# Patient Record
Sex: MALE | Race: WHITE | NOT HISPANIC OR LATINO | URBAN - NONMETROPOLITAN AREA
[De-identification: names, ages, dates, MRNs, and addresses within clinical notes are randomized per-mention and may not be internally consistent; named-entity substitution may affect disease eponyms.]

---

## 2019-01-11 ENCOUNTER — IMPORTED ENCOUNTER (OUTPATIENT)
Dept: URBAN - NONMETROPOLITAN AREA CLINIC 1 | Facility: CLINIC | Age: 79
End: 2019-01-11

## 2019-01-11 PROBLEM — H26.493: Noted: 2019-01-11

## 2019-01-11 PROBLEM — H16.223: Noted: 2019-01-11

## 2019-01-11 PROBLEM — Z96.1: Noted: 2019-01-11

## 2019-01-11 PROBLEM — H35.3132: Noted: 2019-01-11

## 2019-01-11 PROCEDURE — 99214 OFFICE O/P EST MOD 30 MIN: CPT

## 2019-01-11 PROCEDURE — 92134 CPTRZ OPH DX IMG PST SGM RTA: CPT

## 2019-01-11 NOTE — PATIENT DISCUSSION
ARMD OU-  discussed findings w/patient-  OCT mac done today OD: R. 1+ hard drusen RPE changes OS: R. 1+ hard drusen RPE changes-  recommend patient start PreserVision AREDS 2 PO BID samples given-  continue without Amsler Grid at this time-  monitor Q6 months or prn Pseudophakia w/PCO OU-  discussed findings w/patient-  both intraocular lens are stable and in position-  1+ central PCO OU not yet surgical-  continue to monitor Q6months or prn Dry Eye Syndrome-  discussed findings w/patient-  continue tears OU PRN-  monitor prn; Dr's Notes: MR DFE 1/11/2019OCT mac 1/11/2019

## 2019-03-08 ENCOUNTER — IMPORTED ENCOUNTER (OUTPATIENT)
Dept: URBAN - NONMETROPOLITAN AREA CLINIC 1 | Facility: CLINIC | Age: 79
End: 2019-03-08

## 2019-03-08 PROBLEM — H26.493: Noted: 2019-03-08

## 2019-03-08 PROBLEM — H16.223: Noted: 2019-03-08

## 2019-03-08 PROBLEM — H35.3132: Noted: 2019-03-08

## 2019-03-08 PROBLEM — H00.021: Noted: 2019-03-08

## 2019-03-08 PROBLEM — Z96.1: Noted: 2019-03-08

## 2019-03-08 PROCEDURE — 92012 INTRM OPH EXAM EST PATIENT: CPT

## 2019-03-08 NOTE — PATIENT DISCUSSION
Internal Hordeolum RUL-  discussed findings w/patient-  start Keflex 500mg PO BID x 10 days-  start Maxitrol QID OD -  RTC 2 week f/u or prn ARMD OU-  discussed findings w/patient-  OCT mac done today OD: R. 1+ hard drusen RPE changes OS: R. 1+ hard drusen RPE changes-  recommend patient start PreserVision AREDS 2 PO BID samples given-  continue without Amsler Grid at this time-  monitor Q6 months or prn Pseudophakia w/PCO OU-  discussed findings w/patient-  both intraocular lens are stable and in position-  1+ central PCO OU not yet surgical-  continue to monitor Q6months or prn Dry Eye Syndrome-  discussed findings w/patient-  continue tears OU PRN-  monitor prn; 's Notes: MR DFE 1/11/2019OCT mac 1/11/2019

## 2019-03-22 ENCOUNTER — IMPORTED ENCOUNTER (OUTPATIENT)
Dept: URBAN - NONMETROPOLITAN AREA CLINIC 1 | Facility: CLINIC | Age: 79
End: 2019-03-22

## 2019-03-22 PROCEDURE — 92012 INTRM OPH EXAM EST PATIENT: CPT

## 2019-03-22 NOTE — PATIENT DISCUSSION
Internal Hordeolum RUL resolving/ Bleph/HILTON-  discussed findings w/patient-  patient much improved today-  patient finished Keflex 500mg PO BID -  start Sterilid QD OU-  start Systane Complete BID OU samples given -  d/c Maxitrol QID OD -  RTC as scheduled or prn ARMD OU-  discussed findings w/patient-  OCT mac done 1/11/2019 OD: R. 1+ hard drusen RPE changes OS: R. 1+ hard drusen RPE changes-  recommend patient start PreserVision AREDS 2 PO BID samples given-  continue without Amsler Grid at this time-  monitor Q6 months or prn Pseudophakia w/PCO OU-  discussed findings w/patient-  both intraocular lens are stable and in position-  1+ central PCO OU not yet surgical-  continue to monitor Q6months or prn; 's Notes: MR DFE 1/11/2019OCT mac 1/11/2019

## 2019-07-19 ENCOUNTER — IMPORTED ENCOUNTER (OUTPATIENT)
Dept: URBAN - NONMETROPOLITAN AREA CLINIC 1 | Facility: CLINIC | Age: 79
End: 2019-07-19

## 2019-07-19 PROCEDURE — 99214 OFFICE O/P EST MOD 30 MIN: CPT

## 2019-07-19 PROCEDURE — 92134 CPTRZ OPH DX IMG PST SGM RTA: CPT

## 2019-07-19 NOTE — PATIENT DISCUSSION
ARMD OU-  discussed findings w/patient-  OCT mac done today OD: R. 1+ hard drusen RPE changes OS: R. 1+ hard drusen RPE changes stable from previous-  continue PreserVision AREDS 2 PO BID-  continue without Amsler Grid at this time-  monitor Q6 months w/Fundus Photos or prn Pseudophakia w/PCO OS>OD-  discussed findings w/patient-  both intraocular lens are stable and in position-  1+ PCO OD and 2+ PCO OS -  discussed referral to [facility] for PCO eval soon will re-evaluate at next visit-  continue to monitor Q6months w/BAT or prn; 's Notes: MR DFE 1/11/2019OCT mac 7/19/2019Fundus

## 2020-01-24 ENCOUNTER — IMPORTED ENCOUNTER (OUTPATIENT)
Dept: URBAN - NONMETROPOLITAN AREA CLINIC 1 | Facility: CLINIC | Age: 80
End: 2020-01-24

## 2020-01-24 PROBLEM — H26.493: Noted: 2020-01-24

## 2020-01-24 PROBLEM — Z96.1: Noted: 2020-01-24

## 2020-01-24 PROBLEM — H35.3132: Noted: 2020-01-24

## 2020-01-24 PROBLEM — H16.223: Noted: 2020-01-24

## 2020-01-24 PROBLEM — H00.021: Noted: 2020-01-24

## 2020-01-24 PROCEDURE — 92250 FUNDUS PHOTOGRAPHY W/I&R: CPT

## 2020-01-24 PROCEDURE — 92014 COMPRE OPH EXAM EST PT 1/>: CPT

## 2020-01-24 NOTE — PATIENT DISCUSSION
ARMD OU-  discussed findings w/patient- Fundus Photos done today. -  OCT mac done today OD: R. 1+ hard drusen RPE changes OS: R. 1+ hard drusen RPE changes stable from previous-  Fundus Photos done today: OD&OS stabl withclinical findings -  continue PreserVision AREDS 2 PO BID-  continue without Amsler Grid at this time-  monitor Q6 months w/Fundus Photos or prn Pseudophakia w/PCO OS>OD-  discussed findings w/patient-  both intraocular lens are stable and in position-  2+ PCO noted OU no treatment indicated at this time-  discussed referral to [address] [place] [address] for PCO eval soon will re-evaluate at next visit-  continue to monitor Q6months w/BAT or prn HILTON OU -  discussed findings w/patient-  discussed with patient his decreased VA and FB sensation is likely secondary to a HILTON flare up. Will have patient start [address] 0.1% and recheck in 2 weeks.  -  continue Systane BID OU -  start FML 0.1% QID OU x 2 weeks-  continue Sterilid QD OU -  monitor 2 weeks f/u HILTON or prn; 's Notes: MR DFE 1/23/2020OCT mac 7/19/2019Fundus

## 2020-02-06 ENCOUNTER — IMPORTED ENCOUNTER (OUTPATIENT)
Dept: URBAN - NONMETROPOLITAN AREA CLINIC 1 | Facility: CLINIC | Age: 80
End: 2020-02-06

## 2020-02-06 PROBLEM — H35.3132: Noted: 2020-02-06

## 2020-02-06 PROBLEM — Z96.1: Noted: 2020-02-06

## 2020-02-06 PROBLEM — H26.493: Noted: 2020-02-06

## 2020-02-06 PROBLEM — H16.223: Noted: 2020-02-06

## 2020-02-06 PROCEDURE — 99213 OFFICE O/P EST LOW 20 MIN: CPT

## 2020-02-06 NOTE — PATIENT DISCUSSION
HILTON OU -  discussed findings w/patient-  condition has improved since last appt-  continue Systane BID OU -  taper FML 0.1% BID OU x 2 weeks then d/c-  continue Sterilid QD OU -  monitor as scheduled or prn Pseudophakia w/PCO OS>OD-  discussed findings w/patient-  both intraocular lens are stable and in position-  2+ PCO noted OU visually significant with patient complaints -  discussed referral to [address] [place] [address] for PCO eval patient agrees with plan -  refer to [address] [place] [address] for PCO EvalARMD OU-  discussed findings w/patient -  OCT mac done 7/19/2019 OD: R. 1+ hard drusen RPE changes OS: R. 1+ hard drusen RPE changes stable from previous-  Fundus Photos done 1/24/2020: OD&OS stabl withclinical findings -  continue PreserVision AREDS 2 PO BID-  continue without Amsler Grid at this time-  monitor Q6 months w/OCT Mac or prn; Dr's Notes:  JOSHUA 1/23/2020OCT mac 7/19/2019Fundus 1/24/2020

## 2020-06-22 ENCOUNTER — IMPORTED ENCOUNTER (OUTPATIENT)
Dept: URBAN - NONMETROPOLITAN AREA CLINIC 1 | Facility: CLINIC | Age: 80
End: 2020-06-22

## 2020-06-22 PROBLEM — Z96.1: Noted: 2020-02-06

## 2020-06-22 PROBLEM — H16.223: Noted: 2020-06-22

## 2020-06-22 PROBLEM — H35.3132: Noted: 2020-02-06

## 2020-06-22 PROBLEM — H26.493: Noted: 2020-06-22

## 2020-06-22 PROCEDURE — 92014 COMPRE OPH EXAM EST PT 1/>: CPT

## 2020-06-22 PROCEDURE — 66821 AFTER CATARACT LASER SURGERY: CPT

## 2020-06-22 NOTE — PATIENT DISCUSSION
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 -  Pt elects to proceed. YAG Caps OD today and YAG Caps OS on Thursday @ [facility].  HILTON OU -  discussed findings w/patient-  condition has improved since last appt-  continue Systane BID OU -  continue Sterilid QD OU -  monitor as scheduled or prn ARMD OU-  discussed findings w/patient -  OCT mac done 7/19/2019 OD: R. 1+ hard drusen RPE changes OS: R. 1+ hard drusen RPE changes stable from previous-  Fundus Photos done 1/24/2020: OD&OS stabl withclinical findings -  continue PreserVision AREDS 2 PO BID-  continue without Amsler Grid at this time-  monitor Q6 months w/OCT Mac or prn; Dr's Notes: MR LEWIS 1/23/2020OCT mac 7/19/2019Fundus 1/24/2020

## 2020-07-02 ENCOUNTER — IMPORTED ENCOUNTER (OUTPATIENT)
Dept: URBAN - NONMETROPOLITAN AREA CLINIC 1 | Facility: CLINIC | Age: 80
End: 2020-07-02

## 2020-07-02 PROCEDURE — 99024 POSTOP FOLLOW-UP VISIT: CPT

## 2020-07-02 NOTE — PATIENT DISCUSSION
s/p YAG PC OS 6/25/2020 OD 6/22/2020-  discussed findings w/patient-  doing well at this time-  patient defers spectacle Rx -  continue to monitor -  3 mo f/u or prn; 's Notes: MR DFE 1/23/2020OCT mac 7/19/2019Fundus 1/24/2020

## 2020-11-05 ENCOUNTER — IMPORTED ENCOUNTER (OUTPATIENT)
Dept: URBAN - NONMETROPOLITAN AREA CLINIC 1 | Facility: CLINIC | Age: 80
End: 2020-11-05

## 2020-11-05 PROCEDURE — 92134 CPTRZ OPH DX IMG PST SGM RTA: CPT

## 2020-11-05 PROCEDURE — 99213 OFFICE O/P EST LOW 20 MIN: CPT

## 2020-11-05 NOTE — PATIENT DISCUSSION
ARMD OU-  discussed findings w/patient -  OCT mac done 11/5/2020    OD: mild RPE changes mild hard drusen    OS: mild RPE changes mild hard drusen-  Fundus Photos done 1/24/2020: OD&OS marcia withclinical findings -  continue PreserVision AREDS 2 PO BID-  continue without Amsler Grid at this time-  monitor Q6 months w/OCT Mac or prn Pseudophakia w/Open Capsules OU-  discussed findings w/patient-  both intraocular lens are stable and in position-  open capsules OU-  continue to monitor as scheduled or prnDES OU -  discussed findings w/patient-  condition has improved since last appt-  continue Systane BID OU -  continue Sterilid QD OU -  monitor as scheduled or prn; 's Notes:  DFE 1/23/2020OCT mac 11/5/2020Fundus 1/24/2020

## 2021-02-07 PROBLEM — H35.3132: Noted: 2021-02-07

## 2021-02-07 PROBLEM — H16.223: Noted: 2021-02-07

## 2021-02-07 PROBLEM — Z96.1: Noted: 2021-02-07

## 2021-05-18 ENCOUNTER — IMPORTED ENCOUNTER (OUTPATIENT)
Dept: URBAN - NONMETROPOLITAN AREA CLINIC 1 | Facility: CLINIC | Age: 81
End: 2021-05-18

## 2021-05-18 PROBLEM — H52.03: Noted: 2021-05-18

## 2021-05-18 PROBLEM — Z96.1: Noted: 2021-05-18

## 2021-05-18 PROBLEM — H52.4: Noted: 2021-05-18

## 2021-05-18 PROBLEM — H16.223: Noted: 2021-05-18

## 2021-05-18 PROBLEM — H35.3132: Noted: 2021-05-18

## 2021-05-18 PROBLEM — H52.223: Noted: 2021-05-18

## 2021-05-18 PROCEDURE — 92015 DETERMINE REFRACTIVE STATE: CPT

## 2021-05-18 PROCEDURE — 92014 COMPRE OPH EXAM EST PT 1/>: CPT

## 2021-05-18 NOTE — PATIENT DISCUSSION
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-  discussed findings w/patient-  no spectacle Rx issued at this time patient defers-  continue w/NVO's for now-  RTC as scheduled or prnARMD OU-  discussed findings w/patient -  OCT mac done 11/5/2020    OD: mild RPE changes mild hard drusen    OS: mild RPE changes mild hard drusen-  Fundus Photos done 1/24/2020: OD&OS stabl withclinical findings -  continue PreserVision AREDS 2 PO BID-  continue without Amsler Grid at this time-  monitor Q6 months w/OCT Mac or prn Pseudophakia w/Open Capsules OU-  discussed findings w/patient-  both intraocular lens are stable and in position-  open capsules OU-  continue to monitor as scheduled or prnDES OU -  discussed findings w/patient-  condition has improved since last appt-  continue Systane BID OU -  continue Sterilid QD OU -  monitor as scheduled or prn; Dr's Notes: MR 5/18/2021DFE 5/18/2021OCT Mac 5/18/2021Fundus 1/24/2020

## 2021-11-16 ENCOUNTER — IMPORTED ENCOUNTER (OUTPATIENT)
Dept: URBAN - NONMETROPOLITAN AREA CLINIC 1 | Facility: CLINIC | Age: 81
End: 2021-11-16

## 2021-11-16 PROCEDURE — 92134 CPTRZ OPH DX IMG PST SGM RTA: CPT

## 2021-11-16 PROCEDURE — 99213 OFFICE O/P EST LOW 20 MIN: CPT

## 2022-04-15 ASSESSMENT — VISUAL ACUITY
OU_CC: 20/25+2
OU_SC: 20/100
OS_GLARE: 20/40
OD_CC: 20/40+2
OS_CC: 20/25
OS_CC: 20/25-2
OS_CC: 20/30+2
OD_CC: 20/50
OS_CC: 20/30
OS_CC: 20/25
OD_CC: 20/40+2
OS_CC: 20/30+2
OS_CC: 20/25-1
OS_CC: 20/30
OD_PH: 20/20-2
OS_CC: 20/25-2
OS_GLARE: 20/100
OD_CC: 20/20
OU_CC: 20/20
OD_PH: 20/25
OD_GLARE: 20/80
OD_PH: 20/25-
OS_GLARE: 20/60
OD_CC: 20/25
OS_CC: 20/25
OD_CC: 20/40-3
OD_CC: 20/25
OD_CC: 20/30+2
OD_CC: 20/40
OD_CC: 20/25-2
OU_CC: 20/20
OD_GLARE: 20/30
OD_CC: 20/20-
OD_GLARE: 20/60
OS_CC: 20/30+2

## 2022-04-15 ASSESSMENT — TONOMETRY
OS_IOP_MMHG: 15
OS_IOP_MMHG: 15
OD_IOP_MMHG: 14
OD_IOP_MMHG: 14
OS_IOP_MMHG: 14
OS_IOP_MMHG: 16
OD_IOP_MMHG: 13
OD_IOP_MMHG: 15
OS_IOP_MMHG: 14
OS_IOP_MMHG: 14
OD_IOP_MMHG: 14
OS_IOP_MMHG: 15
OD_IOP_MMHG: 14
OD_IOP_MMHG: 15
OD_IOP_MMHG: 16
OD_IOP_MMHG: 15
OS_IOP_MMHG: 15
OD_IOP_MMHG: 15
OS_IOP_MMHG: 15
OS_IOP_MMHG: 13
OS_IOP_MMHG: 16
OD_IOP_MMHG: 14